# Patient Record
Sex: MALE | Race: WHITE | Employment: OTHER | URBAN - METROPOLITAN AREA
[De-identification: names, ages, dates, MRNs, and addresses within clinical notes are randomized per-mention and may not be internally consistent; named-entity substitution may affect disease eponyms.]

---

## 2017-03-06 DIAGNOSIS — G25.0 ESSENTIAL TREMOR: Primary | ICD-10-CM

## 2017-03-06 NOTE — TELEPHONE ENCOUNTER
Patient informed of 48 hour refill policy excluding weekends and holidays. Further explained patient will not receive a call back once prescription is ready.

## 2017-03-09 RX ORDER — PRIMIDONE 50 MG/1
25 TABLET ORAL NIGHTLY
Qty: 30 TABLET | Refills: 5 | Status: SHIPPED | OUTPATIENT
Start: 2017-03-09 | End: 2018-02-19

## 2017-03-23 ENCOUNTER — OFFICE VISIT (OUTPATIENT)
Dept: NEUROLOGY | Facility: CLINIC | Age: 72
End: 2017-03-23

## 2017-03-23 VITALS
HEART RATE: 98 BPM | BODY MASS INDEX: 30 KG/M2 | DIASTOLIC BLOOD PRESSURE: 78 MMHG | SYSTOLIC BLOOD PRESSURE: 130 MMHG | WEIGHT: 209 LBS | RESPIRATION RATE: 18 BRPM

## 2017-03-23 DIAGNOSIS — G62.9 NEUROPATHY: Primary | ICD-10-CM

## 2017-03-23 DIAGNOSIS — G25.0 ESSENTIAL TREMOR: ICD-10-CM

## 2017-03-23 DIAGNOSIS — I65.01 VERTEBRAL ARTERY STENOSIS, RIGHT: ICD-10-CM

## 2017-03-23 PROCEDURE — 99213 OFFICE O/P EST LOW 20 MIN: CPT | Performed by: OTHER

## 2017-03-23 NOTE — PATIENT INSTRUCTIONS
Refill policies:    • Allow 2 business days for refills; controlled substances may take longer.   • Contact your pharmacy at least 5 days prior to running out of medication and have them send an electronic request or submit request through the “request re insurance carrier to obtain pre-certification or prior authorization. Unfortunately, [employer] has seen an increase in denial of payment even though the procedure/test has been pre-certified.   You are strongly encouraged to contact your insurance carrier to v

## 2017-03-23 NOTE — PROGRESS NOTES
[facility] with [facility] [place] 70  10/17/1945  Primary Care Provider:  Ady Chapa MD    3/23/2017    66year old yo patient being seen for:  Neuropathic pain, tremors and rece thyromegaly  Lungs clear breath sounds  Heart S1S2, no abnormal sounds  Pink nailbeds no Cyanosis, pulses palpated    Attention, reasoning, memory and comprehension are intact.   Language and speech normal  CN: 2-12 bilateral CN VI? (looks like convergence

## 2017-07-13 DIAGNOSIS — G25.0 ESSENTIAL TREMOR: ICD-10-CM

## 2017-07-13 DIAGNOSIS — G62.9 NEUROPATHY: ICD-10-CM

## 2017-07-13 DIAGNOSIS — G60.9 IDIOPATHIC NEUROPATHY: ICD-10-CM

## 2017-07-13 RX ORDER — NORTRIPTYLINE HYDROCHLORIDE 50 MG/1
CAPSULE ORAL
Qty: 90 CAPSULE | Refills: 1 | Status: SHIPPED | OUTPATIENT
Start: 2017-07-13 | End: 2018-09-19

## 2017-07-13 NOTE — TELEPHONE ENCOUNTER
Medication: Nortriptyline 50 mg    Date of last refill: 09/26/16 with 2 addt refills # 90  [place]     Last office visit: 3/23/2017  Due back to clinic per last office note:  RTN in 6 months by 09/23/17  Date next office

## 2017-07-18 NOTE — TELEPHONE ENCOUNTER
Pt calling asking about refill, notified pt refill was sent to pharmacy 7/13/17, refill requested by pharmacy

## 2017-09-25 ENCOUNTER — HOSPITAL ENCOUNTER (OUTPATIENT)
Dept: CT IMAGING | Facility: HOSPITAL | Age: 72
Discharge: HOME | End: 2017-09-25
Attending: Other
Payer: MEDICARE

## 2017-09-25 DIAGNOSIS — I65.01 VERTEBRAL ARTERY STENOSIS, RIGHT: ICD-10-CM

## 2017-09-25 PROCEDURE — 70496 CT ANGIOGRAPHY HEAD: CPT | Performed by: OTHER

## 2017-09-25 PROCEDURE — 70498 CT ANGIOGRAPHY NECK: CPT | Performed by: OTHER

## 2017-10-04 ENCOUNTER — OFFICE VISIT (OUTPATIENT)
Dept: NEUROLOGY | Facility: CLINIC | Age: 72
End: 2017-10-04

## 2017-10-04 VITALS
WEIGHT: 209 LBS | SYSTOLIC BLOOD PRESSURE: 100 MMHG | BODY MASS INDEX: 29.92 KG/M2 | HEART RATE: 80 BPM | HEIGHT: 70 IN | RESPIRATION RATE: 16 BRPM | DIASTOLIC BLOOD PRESSURE: 60 MMHG

## 2017-10-04 DIAGNOSIS — G60.9 IDIOPATHIC NEUROPATHY: Primary | ICD-10-CM

## 2017-10-04 DIAGNOSIS — G25.0 ESSENTIAL TREMOR: ICD-10-CM

## 2017-10-04 PROCEDURE — 99213 OFFICE O/P EST LOW 20 MIN: CPT | Performed by: OTHER

## 2017-10-04 NOTE — PROGRESS NOTES
[facility] with [facility] [place] 70  10/17/1945  Primary Care Provider:  Elodia Mcdowell MD    10/4/2017    66year old yo patient being seen for:  neuropathy    Reviewed records  MR no abnormal sounds  Pink nailbeds no Cyanosis, pulses palpated    Neuro: Alert oriented with normal CN 2-12. No motor and sensory deficits. DTRs were symmetric and no upper motor neuron signs.  Coordination was normal.    CTA showed no major carotid stenosi

## 2017-10-04 NOTE — PATIENT INSTRUCTIONS
Refill policies:    • Allow 2-3 business days for refills; controlled substances may take longer.   • Contact your pharmacy at least 5 days prior to running out of medication and have them send an electronic request or submit request through the [facility] have a procedure or additional testing performed. [facility]) will contact your insurance carrier to obtain pre-certification or prior authorization.     Unfortunately, [employer] has seen an increase in denial of payment even though the p

## 2017-12-06 PROBLEM — Z85.828 PERSONAL HISTORY OF OTHER MALIGNANT NEOPLASM OF SKIN: Status: ACTIVE | Noted: 2017-12-06

## 2018-01-09 DIAGNOSIS — G25.0 ESSENTIAL TREMOR: ICD-10-CM

## 2018-01-09 DIAGNOSIS — G60.9 IDIOPATHIC NEUROPATHY: ICD-10-CM

## 2018-01-09 DIAGNOSIS — G62.9 NEUROPATHY: ICD-10-CM

## 2018-01-10 RX ORDER — NORTRIPTYLINE HYDROCHLORIDE 50 MG/1
CAPSULE ORAL
Qty: 90 CAPSULE | Refills: 2 | Status: SHIPPED | OUTPATIENT
Start: 2018-01-10 | End: 2018-10-05

## 2018-01-10 NOTE — TELEPHONE ENCOUNTER
Medication: Nortriptyline 50 mg    Date of last refill: 07/13/17 with 1 addt refill # 90  [place]     Last office visit: 10/4/2017  Due back to clinic per last office note:  RTN in 1 year   Date next office visit schedul

## 2018-01-11 DIAGNOSIS — G60.9 IDIOPATHIC NEUROPATHY: ICD-10-CM

## 2018-01-11 DIAGNOSIS — G25.0 ESSENTIAL TREMOR: ICD-10-CM

## 2018-01-11 DIAGNOSIS — G62.9 NEUROPATHY: ICD-10-CM

## 2018-01-11 RX ORDER — NORTRIPTYLINE HYDROCHLORIDE 50 MG/1
CAPSULE ORAL
Qty: 90 CAPSULE | Refills: 0 | OUTPATIENT
Start: 2018-01-11

## 2018-02-19 DIAGNOSIS — G25.0 ESSENTIAL TREMOR: ICD-10-CM

## 2018-02-19 RX ORDER — PRIMIDONE 50 MG/1
TABLET ORAL
Qty: 30 TABLET | Refills: 5 | Status: SHIPPED | OUTPATIENT
Start: 2018-02-19 | End: 2019-02-11

## 2018-02-19 NOTE — TELEPHONE ENCOUNTER
Medication: Primidone 50 mg     Date of last refill: 03/09/2017 with 5 addt refills  [place]      Last office visit: 10/4/2017  Due back to clinic per last office note:  RTN in 1 year   Date next office visit scheduled:

## 2018-09-19 ENCOUNTER — OFFICE VISIT (OUTPATIENT)
Dept: NEUROLOGY | Facility: CLINIC | Age: 73
End: 2018-09-19
Payer: MEDICARE

## 2018-09-19 VITALS
WEIGHT: 218 LBS | HEIGHT: 70 IN | SYSTOLIC BLOOD PRESSURE: 134 MMHG | RESPIRATION RATE: 16 BRPM | DIASTOLIC BLOOD PRESSURE: 80 MMHG | BODY MASS INDEX: 31.21 KG/M2 | HEART RATE: 78 BPM

## 2018-09-19 DIAGNOSIS — G25.0 ESSENTIAL TREMOR: ICD-10-CM

## 2018-09-19 DIAGNOSIS — G60.9 IDIOPATHIC NEUROPATHY: Primary | ICD-10-CM

## 2018-09-19 PROCEDURE — 99213 OFFICE O/P EST LOW 20 MIN: CPT | Performed by: OTHER

## 2018-09-19 NOTE — PROGRESS NOTES
[facility] with [facility] [place] 70  10/17/1945  Primary Care Provider:  Bashir Zavala MD    9/19/2018  Accompanied visit:  ( ) No (x) yes    67year old yo patient being seen for: results, and the following relevant information are as noted in appropriate sections above and below.       EXAM:  /80 (BP Location: Left arm, Patient Position: Sitting, Cuff Size: large)   Pulse 78   Resp 16   Ht 70\"   Wt 218 lb   BMI 31.28 kg/m² F2F  (  ) Fam meetings - patient not present --non F2F  Non Face to Face CPT code 63892/45141 applies as documented above    PROCEDURE DONE     (   ) see notes  Visits are done with \"open doors and windows\" exam rooms, except when patient requests privac

## 2018-09-19 NOTE — PATIENT INSTRUCTIONS
Refill policies:    • Allow 2-3 business days for refills; controlled substances may take longer.   • Contact your pharmacy at least 5 days prior to running out of medication and have them send an electronic request or submit request through the “request re entire amount billed. Precertification and Prior Authorizations: If your physician has recommended that you have a procedure or additional testing performed.   [facility]) will contact your insurance carrier to obtain pre-certi

## 2018-10-05 DIAGNOSIS — G62.9 NEUROPATHY: ICD-10-CM

## 2018-10-05 DIAGNOSIS — G60.9 IDIOPATHIC NEUROPATHY: ICD-10-CM

## 2018-10-05 DIAGNOSIS — G25.0 ESSENTIAL TREMOR: ICD-10-CM

## 2018-10-05 RX ORDER — NORTRIPTYLINE HYDROCHLORIDE 50 MG/1
CAPSULE ORAL
Qty: 90 CAPSULE | Refills: 2 | Status: SHIPPED | OUTPATIENT
Start: 2018-10-05 | End: 2019-07-09

## 2018-10-05 NOTE — TELEPHONE ENCOUNTER
Medication: Nortriptyline 50 mg    Date of last refill: 01/10/18 with 3 addt refills  [place]     Last office visit: 9/19/2018  Due back to clinic per last office note: RTN in 1 year 09/19/19  Date next office visit sche

## 2018-12-04 ENCOUNTER — TELEPHONE (OUTPATIENT)
Dept: NEUROLOGY | Facility: CLINIC | Age: 73
End: 2018-12-04

## 2018-12-04 RX ORDER — NORTRIPTYLINE HYDROCHLORIDE 25 MG/1
25 CAPSULE ORAL NIGHTLY
Qty: 30 CAPSULE | Refills: 3 | Status: SHIPPED | OUTPATIENT
Start: 2018-12-04 | End: 2019-03-02

## 2018-12-04 NOTE — TELEPHONE ENCOUNTER
S: Increased neuropathy symptoms    B: LOV 9/19/18 with notes:Discussion on the case:  Stable we will keep on current medications. Renew when it is due. Otherwise I will see him in a year. A: Feels that his neuropathy symptoms worsened in past week.  B

## 2019-02-11 DIAGNOSIS — G25.0 ESSENTIAL TREMOR: ICD-10-CM

## 2019-02-11 RX ORDER — PRIMIDONE 50 MG/1
TABLET ORAL
Qty: 30 TABLET | Refills: 5 | Status: SHIPPED | OUTPATIENT
Start: 2019-02-11

## 2019-02-11 NOTE — TELEPHONE ENCOUNTER
Medication: Primidone 50 mg     Date of last refill: 02/19/18 with 5 addt refills  [place]      Last office visit: 9/19/2018  Due back to clinic per last office note: RTN in 1 year 09/19/19  Date next office visit yadi

## 2019-03-04 RX ORDER — NORTRIPTYLINE HYDROCHLORIDE 25 MG/1
CAPSULE ORAL
Qty: 90 CAPSULE | Refills: 3 | Status: SHIPPED | OUTPATIENT
Start: 2019-03-04

## 2019-03-04 NOTE — TELEPHONE ENCOUNTER
Medication: Nortriptyline HCl 25 MG Oral Cap    Date of last refill: 12/4/18(30/3)  [place]     Last office visit: 9/19/2018  Due back to clinic per last office note: Yearly    Date next office visit scheduled:  Alexus watson

## 2019-03-07 ENCOUNTER — TELEPHONE (OUTPATIENT)
Dept: NEUROLOGY | Facility: CLINIC | Age: 74
End: 2019-03-07

## 2019-03-07 NOTE — TELEPHONE ENCOUNTER
Patient is been having neuropathy for long time and he wants to ask Dr. Joslyn Yung his opinion of having an injection call Tarsal near the nerves at the [facility] in [place] by Chiropractic Dr. Jayla Rosales and Anesthesiology/Pain Dr. Gogo Meza.  Please call h

## 2019-03-07 NOTE — TELEPHONE ENCOUNTER
I spoke with the patient, told him I am not sure of the nature of the procedure but if desperate in controlling pain, then maybe it is worth trying keeping in mind that there are always risks

## 2019-04-23 ENCOUNTER — OFFICE VISIT (OUTPATIENT)
Dept: NEUROLOGY | Facility: CLINIC | Age: 74
End: 2019-04-23
Payer: MEDICARE

## 2019-04-23 VITALS
BODY MASS INDEX: 31.21 KG/M2 | WEIGHT: 218 LBS | HEIGHT: 70 IN | SYSTOLIC BLOOD PRESSURE: 130 MMHG | DIASTOLIC BLOOD PRESSURE: 74 MMHG | RESPIRATION RATE: 16 BRPM | HEART RATE: 92 BPM

## 2019-04-23 DIAGNOSIS — G25.0 ESSENTIAL TREMOR: ICD-10-CM

## 2019-04-23 DIAGNOSIS — G60.9 IDIOPATHIC NEUROPATHY: Primary | ICD-10-CM

## 2019-04-23 DIAGNOSIS — G62.9 NEUROPATHY: ICD-10-CM

## 2019-04-23 PROCEDURE — 99214 OFFICE O/P EST MOD 30 MIN: CPT | Performed by: OTHER

## 2019-04-23 RX ORDER — GABAPENTIN 100 MG/1
100 CAPSULE ORAL 2 TIMES DAILY
Qty: 60 CAPSULE | Refills: 5 | Status: SHIPPED | OUTPATIENT
Start: 2019-04-23

## 2019-04-23 NOTE — PROGRESS NOTES
[facility] with [facility] [place] 70  10/17/1945  Primary Care Provider:  Alonso Murillo MD    4/23/2019  Accompanied visit:  ( ) No (x) yes, by: wife    75year old yo patient being Known Allergies         EXAM:  /74 (BP Location: Left arm, Patient Position: Sitting, Cuff Size: adult)   Pulse 92   Resp 16   Ht 70\"   Wt 218 lb   BMI 31.28 kg/m²   Looks stated age  Pink conjunctiva anicteric sclerae, moist mucosa  Lungs clear rufus time with patierleon or authorized Rosario Sims F2F  (  ) other records reviewed --non F2F  (  ) Fam meetings - patient not present --non F2F  Non Face to Face CPT code 59670/43936 applies as documented above    PROCEDURE DONE     (   ) see notes  Visits a

## 2019-06-04 ENCOUNTER — TELEPHONE (OUTPATIENT)
Dept: NEUROLOGY | Facility: CLINIC | Age: 74
End: 2019-06-04

## 2019-06-05 NOTE — TELEPHONE ENCOUNTER
S: Anahy Leal is c/o of right/left foot pain. Pt has hx of neuropathy. B: according to LOV:  Increased TCA last time and it did not help  Went to a pain doctor who offered blocking and injections but they did not trust the provider.  Patient is currently [place]

## 2019-06-05 NOTE — TELEPHONE ENCOUNTER
Called pain feels that it is coming from the Bone and hurts when walking and advised to see what Podiatrist says on Friday but can increase Gabapentin to 100 mg TID  If no answers send to [facility] for Neuropathy therapy

## 2019-07-09 DIAGNOSIS — G60.9 IDIOPATHIC NEUROPATHY: ICD-10-CM

## 2019-07-09 DIAGNOSIS — G62.9 NEUROPATHY: ICD-10-CM

## 2019-07-09 DIAGNOSIS — G25.0 ESSENTIAL TREMOR: ICD-10-CM

## 2019-07-09 RX ORDER — NORTRIPTYLINE HYDROCHLORIDE 50 MG/1
CAPSULE ORAL
Qty: 90 CAPSULE | Refills: 1 | Status: SHIPPED | OUTPATIENT
Start: 2019-07-09

## 2019-07-09 NOTE — TELEPHONE ENCOUNTER
Medication: Nortriptyline 25 mg    Date of last refill: 04/04/2019 with 3 addt refills # 90  [place]     Last office visit: 4/23/2019  Due back to clinic per last office note:  RTN in 6 months  Date next office visit yadi

## 2019-08-29 ENCOUNTER — TELEPHONE (OUTPATIENT)
Dept: NEUROLOGY | Facility: CLINIC | Age: 74
End: 2019-08-29

## 2019-11-18 ENCOUNTER — TELEPHONE (OUTPATIENT)
Dept: NEUROLOGY | Facility: CLINIC | Age: 74
End: 2019-11-18

## 2019-11-18 NOTE — TELEPHONE ENCOUNTER
Pt signed medical records CAROL   DOS:  2016 to present  Continuation of care  Send to:  Patient    Completed and given to pt  Copy to scanning

## (undated) NOTE — MR AVS SNAPSHOT
[facility]  [address] [address] [phone]               Thank you for choosing us for your health care visit with Michael Kang MD.  We are glad to serve you and happy to provide you with this summary of your v ? Contact your pharmacy at least 5 days prior to running out of medication and have them send an electronic request or submit request through the “request refill” option in your [employer] account. ?  Refills are not addressed on weekends; covering physicians Unfortunately, [employer] has seen an increase in denial of payment even though the procedure/test has been pre-certified.   You are strongly encouraged to contact your insurance carrier to verify that your procedure/test has been approved and is a COVERED benefit Weight Reduction Maintain normal body weight (body mass index 18.5-24.9 kg/m2)   DASH eating plan Adopt a diet rich in fruits, vegetables, and low fat dairy products with reduced content of saturated and total fat.    Dietary sodium reduction Reduce dietary